# Patient Record
Sex: MALE | Race: WHITE | Employment: FULL TIME | URBAN - METROPOLITAN AREA
[De-identification: names, ages, dates, MRNs, and addresses within clinical notes are randomized per-mention and may not be internally consistent; named-entity substitution may affect disease eponyms.]

---

## 2023-06-29 ENCOUNTER — TRANSCRIBE ORDERS (OUTPATIENT)
Facility: HOSPITAL | Age: 73
End: 2023-06-29

## 2023-06-29 DIAGNOSIS — N50.89 TESTICULAR MASS: Primary | ICD-10-CM

## 2023-06-30 ENCOUNTER — HOSPITAL ENCOUNTER (OUTPATIENT)
Facility: HOSPITAL | Age: 73
Discharge: HOME | End: 2023-06-30
Attending: FAMILY MEDICINE
Payer: COMMERCIAL

## 2023-06-30 DIAGNOSIS — N50.89 TESTICULAR MASS: ICD-10-CM

## 2023-06-30 PROCEDURE — 76870 US EXAM SCROTUM: CPT

## 2024-02-07 ENCOUNTER — HOSPITAL ENCOUNTER (OUTPATIENT)
Facility: HOSPITAL | Age: 74
Discharge: HOME | End: 2024-02-10
Attending: FAMILY MEDICINE
Payer: COMMERCIAL

## 2024-02-07 DIAGNOSIS — K76.0 FATTY (CHANGE OF) LIVER, NOT ELSEWHERE CLASSIFIED: ICD-10-CM

## 2024-02-07 PROCEDURE — 76705 ECHO EXAM OF ABDOMEN: CPT

## 2024-02-08 ENCOUNTER — HOSPITAL ENCOUNTER (OUTPATIENT)
Facility: HOSPITAL | Age: 74
Discharge: HOME | End: 2024-02-08
Attending: SURGERY
Payer: COMMERCIAL

## 2024-02-08 ENCOUNTER — FOLLOWUP TELEPHONE ENCOUNTER (OUTPATIENT)
Facility: HOSPITAL | Age: 74
End: 2024-02-08

## 2024-02-08 VITALS
RESPIRATION RATE: 18 BRPM | HEART RATE: 67 BPM | WEIGHT: 240 LBS | DIASTOLIC BLOOD PRESSURE: 88 MMHG | HEIGHT: 68 IN | SYSTOLIC BLOOD PRESSURE: 141 MMHG | TEMPERATURE: 98.4 F | BODY MASS INDEX: 36.37 KG/M2

## 2024-02-08 DIAGNOSIS — L02.211 ABDOMINAL WALL ABSCESS: Primary | ICD-10-CM

## 2024-02-08 DIAGNOSIS — S31.103A OPEN WOUND OF RIGHT LOWER QUADRANT OF ABDOMINAL WALL WITHOUT PENETRATION INTO PERITONEAL CAVITY, INITIAL ENCOUNTER: ICD-10-CM

## 2024-02-08 PROCEDURE — 87070 CULTURE OTHR SPECIMN AEROBIC: CPT

## 2024-02-08 PROCEDURE — 99203 OFFICE O/P NEW LOW 30 MIN: CPT

## 2024-02-08 PROCEDURE — 87205 SMEAR GRAM STAIN: CPT

## 2024-02-08 PROCEDURE — 11042 DBRDMT SUBQ TIS 1ST 20SQCM/<: CPT

## 2024-02-08 RX ORDER — BETAMETHASONE DIPROPIONATE 0.5 MG/G
CREAM TOPICAL ONCE
OUTPATIENT
Start: 2024-02-08 | End: 2024-02-08

## 2024-02-08 RX ORDER — LIDOCAINE 40 MG/G
CREAM TOPICAL ONCE
OUTPATIENT
Start: 2024-02-08 | End: 2024-02-08

## 2024-02-08 RX ORDER — LIDOCAINE HYDROCHLORIDE 20 MG/ML
JELLY TOPICAL ONCE
Status: CANCELLED | OUTPATIENT
Start: 2024-02-08 | End: 2024-02-08

## 2024-02-08 RX ORDER — PANTOPRAZOLE SODIUM 40 MG/1
40 TABLET, DELAYED RELEASE ORAL DAILY
COMMUNITY

## 2024-02-08 RX ORDER — KETOCONAZOLE 20 MG/G
CREAM TOPICAL DAILY
COMMUNITY

## 2024-02-08 RX ORDER — ATORVASTATIN CALCIUM 40 MG/1
40 TABLET, FILM COATED ORAL DAILY
COMMUNITY

## 2024-02-08 RX ORDER — LORATADINE 10 MG/1
10 TABLET ORAL DAILY
COMMUNITY

## 2024-02-08 RX ORDER — LIDOCAINE 50 MG/G
OINTMENT TOPICAL ONCE
Status: CANCELLED | OUTPATIENT
Start: 2024-02-08 | End: 2024-02-08

## 2024-02-08 RX ORDER — LEVOFLOXACIN 500 MG/1
500 TABLET, FILM COATED ORAL DAILY
COMMUNITY
Start: 2024-01-26 | End: 2024-02-09

## 2024-02-08 RX ORDER — ALBUTEROL SULFATE 90 UG/1
2 AEROSOL, METERED RESPIRATORY (INHALATION) EVERY 4 HOURS PRN
COMMUNITY

## 2024-02-08 RX ORDER — INSULIN GLARGINE 300 U/ML
110 INJECTION, SOLUTION SUBCUTANEOUS DAILY
COMMUNITY

## 2024-02-08 RX ORDER — TRIAMCINOLONE ACETONIDE 1 MG/G
OINTMENT TOPICAL ONCE
OUTPATIENT
Start: 2024-02-08 | End: 2024-02-08

## 2024-02-08 RX ORDER — LIDOCAINE 50 MG/G
OINTMENT TOPICAL ONCE
OUTPATIENT
Start: 2024-02-08 | End: 2024-02-08

## 2024-02-08 RX ORDER — LIDOCAINE HYDROCHLORIDE 20 MG/ML
JELLY TOPICAL ONCE
OUTPATIENT
Start: 2024-02-08 | End: 2024-02-08

## 2024-02-08 RX ORDER — PROCHLORPERAZINE 25 MG/1
SUPPOSITORY RECTAL
COMMUNITY

## 2024-02-08 RX ORDER — SODIUM CHLOR/HYPOCHLOROUS ACID 0.033 %
SOLUTION, IRRIGATION IRRIGATION ONCE
Status: CANCELLED | OUTPATIENT
Start: 2024-02-08 | End: 2024-02-08

## 2024-02-08 RX ORDER — CANDESARTAN CILEXETIL AND HYDROCHLOROTHIAZIDE 16; 12.5 MG/1; MG/1
1 TABLET ORAL DAILY
COMMUNITY

## 2024-02-08 RX ORDER — ALPRAZOLAM 0.5 MG/1
0.5 TABLET ORAL 2 TIMES DAILY PRN
COMMUNITY

## 2024-02-08 RX ORDER — ACETAMINOPHEN 160 MG
TABLET,DISINTEGRATING ORAL DAILY
COMMUNITY

## 2024-02-08 RX ORDER — ASPIRIN AND DIPYRIDAMOLE 25; 200 MG/1; MG/1
1 CAPSULE, EXTENDED RELEASE ORAL 2 TIMES DAILY
COMMUNITY

## 2024-02-08 RX ORDER — LIDOCAINE 40 MG/G
CREAM TOPICAL ONCE
Status: CANCELLED | OUTPATIENT
Start: 2024-02-08 | End: 2024-02-08

## 2024-02-08 RX ORDER — EZETIMIBE 10 MG/1
10 TABLET ORAL DAILY
COMMUNITY

## 2024-02-08 RX ORDER — VITAMIN E 268 MG
400 CAPSULE ORAL DAILY
COMMUNITY

## 2024-02-08 RX ORDER — INSULIN LISPRO 200 [IU]/ML
INJECTION, SOLUTION SUBCUTANEOUS
COMMUNITY

## 2024-02-08 RX ORDER — CLOBETASOL PROPIONATE 0.5 MG/G
OINTMENT TOPICAL ONCE
OUTPATIENT
Start: 2024-02-08 | End: 2024-02-08

## 2024-02-08 RX ORDER — GINSENG 100 MG
CAPSULE ORAL ONCE
OUTPATIENT
Start: 2024-02-08 | End: 2024-02-08

## 2024-02-08 RX ORDER — GINSENG 100 MG
CAPSULE ORAL ONCE
Status: CANCELLED | OUTPATIENT
Start: 2024-02-08 | End: 2024-02-08

## 2024-02-08 RX ORDER — CLOBETASOL PROPIONATE 0.5 MG/G
OINTMENT TOPICAL ONCE
Status: CANCELLED | OUTPATIENT
Start: 2024-02-08 | End: 2024-02-08

## 2024-02-08 RX ORDER — AMOXICILLIN 500 MG
CAPSULE ORAL
COMMUNITY

## 2024-02-08 RX ORDER — BACITRACIN ZINC AND POLYMYXIN B SULFATE 500; 1000 [USP'U]/G; [USP'U]/G
OINTMENT TOPICAL ONCE
OUTPATIENT
Start: 2024-02-08 | End: 2024-02-08

## 2024-02-08 RX ORDER — LIDOCAINE HYDROCHLORIDE 40 MG/ML
SOLUTION TOPICAL ONCE
OUTPATIENT
Start: 2024-02-08 | End: 2024-02-08

## 2024-02-08 RX ORDER — LIDOCAINE HYDROCHLORIDE 40 MG/ML
SOLUTION TOPICAL ONCE
Status: CANCELLED | OUTPATIENT
Start: 2024-02-08 | End: 2024-02-08

## 2024-02-08 RX ORDER — GLIMEPIRIDE 2 MG/1
2 TABLET ORAL 2 TIMES DAILY
COMMUNITY

## 2024-02-08 RX ORDER — SODIUM CHLOR/HYPOCHLOROUS ACID 0.033 %
SOLUTION, IRRIGATION IRRIGATION ONCE
OUTPATIENT
Start: 2024-02-08 | End: 2024-02-08

## 2024-02-08 RX ORDER — ESCITALOPRAM OXALATE 10 MG/1
10 TABLET ORAL DAILY
COMMUNITY

## 2024-02-08 RX ORDER — BETAMETHASONE DIPROPIONATE 0.5 MG/G
CREAM TOPICAL ONCE
Status: CANCELLED | OUTPATIENT
Start: 2024-02-08 | End: 2024-02-08

## 2024-02-08 RX ORDER — CLINDAMYCIN HYDROCHLORIDE 300 MG/1
300 CAPSULE ORAL 3 TIMES DAILY
Qty: 21 CAPSULE | Refills: 0 | Status: SHIPPED | OUTPATIENT
Start: 2024-02-08 | End: 2024-02-15

## 2024-02-08 RX ORDER — TRIAMCINOLONE ACETONIDE 1 MG/G
OINTMENT TOPICAL ONCE
Status: CANCELLED | OUTPATIENT
Start: 2024-02-08 | End: 2024-02-08

## 2024-02-08 RX ORDER — BACITRACIN ZINC AND POLYMYXIN B SULFATE 500; 1000 [USP'U]/G; [USP'U]/G
OINTMENT TOPICAL ONCE
Status: CANCELLED | OUTPATIENT
Start: 2024-02-08 | End: 2024-02-08

## 2024-02-08 RX ORDER — ASPIRIN 81 MG/1
81 TABLET ORAL DAILY
COMMUNITY

## 2024-02-08 NOTE — FLOWSHEET NOTE
02/08/24 0928   Wound 02/08/24 Abdomen Right;Quadrant;Lower #1   Date First Assessed/Time First Assessed: 02/08/24 0850   Present on Original Admission: Yes  Wound Approximate Age at First Assessment (Weeks): 2 weeks  Primary Wound Type: Surgical Type  Location: Abdomen  Wound Location Orientation: Right;Quadrant;L...   Dressing Status New dressing applied   Dressing/Treatment Gauze dressing/dressing sponge;Tape/Soft cloth adhesive tape;Packing  (mupirocin ointment to packing strip)     Discharge Condition: Stable    Pain: 0    Ambulatory Status:Walking    Discharge Destination: Home    Transportation:Car    Accompanied by: Self and Family    Discharge instructions reviewed with Self and Family and copy or written instructions have been provided. All questions/concerns have been addressed at this time.

## 2024-02-08 NOTE — TELEPHONE ENCOUNTER
Culture results received from patients PCP.  Per Dr. Palacios, Levofloxacin has an intermediate coverage of bacteria that is growing.   Dr. Palacios prescribed Clindamycin.  This RN contacted patient to inform him of prescription, possible side effects, and suggestion of probiotics to improve gut side effects.  Patient voiced understanding.  No further questions or concerns at this time.

## 2024-02-08 NOTE — FLOWSHEET NOTE
02/08/24 0851   Anesthetic   Anesthetic 4% Lidocaine Liquid Topical   Wound 02/08/24 Abdomen Right;Quadrant;Lower #1   Date First Assessed/Time First Assessed: 02/08/24 0850   Present on Original Admission: Yes  Wound Approximate Age at First Assessment (Weeks): 2 weeks  Primary Wound Type: Surgical Type  Location: Abdomen  Wound Location Orientation: Right;Quadrant;L...   Wound Image    Dressing Status New drainage noted   Wound Cleansed Cleansed with saline   Wound Length (cm) 0.5 cm   Wound Width (cm) 0.9 cm   Wound Depth (cm) 0.6 cm   Wound Surface Area (cm^2) 0.45 cm^2   Wound Volume (cm^3) 0.27 cm^3   Undermining Starts ___ O'Clock 7   Undermining Ends___ O'Clock 1   Undermining Maxium Distance (cm) 0.8   Wound Assessment Slough;Pink/red;Erythema   Drainage Amount Moderate (25-50%)   Drainage Description Serosanguinous   Odor None   Ruth-wound Assessment Blanchable erythema   Margins Epibole (rolled edges)   Wound Thickness Description not for Pressure Injury Full thickness     BP (!) 141/88   Pulse 67   Temp 98.4 °F (36.9 °C) (Temporal)   Resp 18   Ht 1.727 m (5' 8\")   Wt 108.9 kg (240 lb)   BMI 36.49 kg/m²

## 2024-02-08 NOTE — PATIENT INSTRUCTIONS
Discharge Instructions for  [facility]  [address] [place] [address]  Telephone: [phone]     FAX [phone]    Wound Care Center Information: Should you experience any significant changes in your wound(s) or have questions about your wound care, please contact the [facility] at MONDAY - FRIDAY 8:00 am - 4:30.  If you need help with your wound outside these hours and cannot wait until we are again available, contact your PCP or go to the hospital emergency room.     NAME:  Nicholas Powell  YOB: 1950  DATE:  2/8/2024    : Halle     []    Wound Cleansing:   Do not scrub or use excessive force.  Cleanse wound prior to applying a clean dressing with:  [x] Normal Saline   [] Keep Wound Dry in Shower - may purchase a cast cover at local pharmacy     [] Cleanse wound with Mild Soap & Water    [x] May Shower: coordinate with dressing changes, remove dressing 1st, wash with mild soap and water, pat dry, and redress wound right after with a new dressing  [] Do not shower  [] cleanse with baby shampoo lather leave 2-3 then rinse with water    Topical Treatments:  Do not apply lotions, creams, or ointments to wound bed unless directed.   [] Apply moisturizing lotion such as to skin surrounding the wound prior to dressing change.  [] Other:     Dressings:                   Wound Location Abdomen     Apply Primary Dressing:      Pack wound loosely with:                                                   [x] Mupirocin lined plain packing strips     Cover and Secure with:  [x] Gauze [] ABD [] Optilock    [] Drawtex  [] Sushma [] Kerlix [] Mepilex Border  [] Ace Wrap [x] Roll Tape   [] Other:      Change dressing:   [x] Daily      [] Every Other Day   [] Three times per week  [] Once a week  [] Other:    Dietary:  [] Diet as tolerated [x] Diabetic Diet   [x] Increase Protein: examples (Meat, cheese, eggs, greek yogurt, fish,

## 2024-02-08 NOTE — PROGRESS NOTES
Wound Center  Progress Note / Procedure Note      Chief Complaint:  Nicholas Powell is a 73 y.o. {race/ethnicity:88224} male  with {Anatomy; lower extremities:58196} wound of *** {WEEKS/MONTHS:96934414} duration.    Referred by:  ***      Assessment/Plan     73 y.o. male with     -{Anatomy; lower extremities:23183} chronic ulcer.    -    -  ***    Following discussed with patient / spouse / CG/   ***  Needs :  Serial debridement- prn    Good local wound care  Dressing:  Frequency : three times a week / once daily / once weekly  Order/initiate Home Health***  Order supplies***  Continue Home Health***    -Edema management    -Nutrition optimization    -Good Diabetic control    -Good offloading    Patient/ *** understood and agrees with plan. Questions answered.    Serial visits and serial debridements also discussed.    Follow up with me in 1 week    Subjective:       HPI:     ***  Since last visit***    History/Chart/Medications reviewed    Wound caused by: {typewoundsb:65753}  Current wound care:See flowsheet  Offloading wound: {YES / NO:26744}  Elevating legs: ***  Compression compliance:***  Appetite: {condition:71119}  Wound associated pain: See flowsheet  Diabetic: ***  Smoker: ***  ROS: no N/V/D, no T/chills; no local rash, no chest pain or shortness of breath, no headache or dizzyness    Review of Systems:  Constitutional: Negative    HENT: Negative.   Eyes: Negative.    Respiratory: Negative.   Cardiovascular: Negative.   Gastrointestinal: Negative.   Genitourinary: Negative.   Musculoskeletal: Negative.   Skin: Negative.   Neurological: Negative.   Endo/Heme/Allergies: Negative.   Psychiatric/Behavioral: Negative.     Objective:     Physical Exam:   See flowsheet / nursing notes for vitals  Vitals:    02/08/24 0824   BP: (!) 141/88   Pulse: 67   Resp: 18   Temp: 98.4 °F (36.9 °C)     General: NAD. Hygiene good  Psych: cooperative. No anxiety or depression. Normal mood and affect.  Neuro: alert and oriented  No

## 2024-02-11 LAB
BACTERIA SPEC CULT: ABNORMAL
GRAM STN SPEC: ABNORMAL
SERVICE CMNT-IMP: ABNORMAL

## 2024-02-15 ENCOUNTER — HOSPITAL ENCOUNTER (OUTPATIENT)
Facility: HOSPITAL | Age: 74
Discharge: HOME | End: 2024-02-15
Attending: SURGERY
Payer: COMMERCIAL

## 2024-02-15 VITALS
SYSTOLIC BLOOD PRESSURE: 151 MMHG | RESPIRATION RATE: 18 BRPM | HEART RATE: 68 BPM | TEMPERATURE: 97.1 F | DIASTOLIC BLOOD PRESSURE: 76 MMHG

## 2024-02-15 DIAGNOSIS — L02.211 ABDOMINAL WALL ABSCESS: Primary | ICD-10-CM

## 2024-02-15 DIAGNOSIS — S31.103A OPEN WOUND OF RIGHT LOWER QUADRANT OF ABDOMINAL WALL WITHOUT PENETRATION INTO PERITONEAL CAVITY, INITIAL ENCOUNTER: ICD-10-CM

## 2024-02-15 PROCEDURE — 11042 DBRDMT SUBQ TIS 1ST 20SQCM/<: CPT

## 2024-02-15 RX ORDER — SODIUM CHLOR/HYPOCHLOROUS ACID 0.033 %
SOLUTION, IRRIGATION IRRIGATION ONCE
OUTPATIENT
Start: 2024-02-15 | End: 2024-02-15

## 2024-02-15 RX ORDER — BACITRACIN ZINC AND POLYMYXIN B SULFATE 500; 1000 [USP'U]/G; [USP'U]/G
OINTMENT TOPICAL ONCE
OUTPATIENT
Start: 2024-02-15 | End: 2024-02-15

## 2024-02-15 RX ORDER — LIDOCAINE HYDROCHLORIDE 20 MG/ML
JELLY TOPICAL ONCE
OUTPATIENT
Start: 2024-02-15 | End: 2024-02-15

## 2024-02-15 RX ORDER — CLOBETASOL PROPIONATE 0.5 MG/G
OINTMENT TOPICAL ONCE
OUTPATIENT
Start: 2024-02-15 | End: 2024-02-15

## 2024-02-15 RX ORDER — BETAMETHASONE DIPROPIONATE 0.5 MG/G
CREAM TOPICAL ONCE
OUTPATIENT
Start: 2024-02-15 | End: 2024-02-15

## 2024-02-15 RX ORDER — TRIAMCINOLONE ACETONIDE 1 MG/G
OINTMENT TOPICAL ONCE
OUTPATIENT
Start: 2024-02-15 | End: 2024-02-15

## 2024-02-15 RX ORDER — LIDOCAINE 40 MG/G
CREAM TOPICAL ONCE
OUTPATIENT
Start: 2024-02-15 | End: 2024-02-15

## 2024-02-15 RX ORDER — LIDOCAINE 50 MG/G
OINTMENT TOPICAL ONCE
OUTPATIENT
Start: 2024-02-15 | End: 2024-02-15

## 2024-02-15 RX ORDER — LIDOCAINE HYDROCHLORIDE 40 MG/ML
SOLUTION TOPICAL ONCE
OUTPATIENT
Start: 2024-02-15 | End: 2024-02-15

## 2024-02-15 RX ORDER — GINSENG 100 MG
CAPSULE ORAL ONCE
OUTPATIENT
Start: 2024-02-15 | End: 2024-02-15

## 2024-02-15 NOTE — FLOWSHEET NOTE
02/15/24 0941   Wound 02/08/24 Abdomen Right;Quadrant;Lower #1   Date First Assessed/Time First Assessed: 02/08/24 0850   Present on Original Admission: Yes  Wound Approximate Age at First Assessment (Weeks): 2 weeks  Primary Wound Type: Surgical Type  Location: Abdomen  Wound Location Orientation: Right;Quadrant;L...   Dressing Status New dressing applied   Dressing/Treatment Other (comment);Gauze dressing/dressing sponge;Tape/Soft cloth adhesive tape  (mupirocin)     Discharge Condition: Stable    Pain: 0    Ambulatory Status:Walking    Discharge Destination: Home    Transportation:Car    Accompanied by: Self and Family    Discharge instructions reviewed with Self and Family and copy or written instructions have been provided. All questions/concerns have been addressed at this time.

## 2024-02-15 NOTE — FLOWSHEET NOTE
02/15/24 0916   Anesthetic   Anesthetic 4% Lidocaine Liquid Topical   Wound 02/08/24 Abdomen Right;Quadrant;Lower #1   Date First Assessed/Time First Assessed: 02/08/24 0850   Present on Original Admission: Yes  Wound Approximate Age at First Assessment (Weeks): 2 weeks  Primary Wound Type: Surgical Type  Location: Abdomen  Wound Location Orientation: Right;Quadrant;L...   Wound Image    Wound Cleansed Cleansed with saline   Wound Length (cm) 0.3 cm   Wound Width (cm) 0.8 cm   Wound Depth (cm) 0.5 cm   Wound Surface Area (cm^2) 0.24 cm^2   Change in Wound Size % (l*w) 46.67   Wound Volume (cm^3) 0.12 cm^3   Wound Healing % 56   Undermining Starts ___ O'Clock 7   Undermining Ends___ O'Clock 1   Undermining Maxium Distance (cm) 0.6   Wound Assessment [place]/red;Slough   Drainage Amount Small (< 25%)   Drainage Description Serosanguinous   Odor None   Ruth-wound Assessment Blanchable erythema;Dry/flaky   Margins Epibole (rolled edges)   Wound Thickness Description not for Pressure Injury Full thickness   Pain Assessment   Pain Assessment None - Denies Pain     BP (!) 151/76   Pulse 68   Temp 97.1 °F (36.2 °C) (Temporal)   Resp 18

## 2024-02-15 NOTE — PATIENT INSTRUCTIONS
Discharge Instructions for  [facility]  [address] [place] [address]  Telephone: [phone]     FAX [phone]    Wound Care Center Information: Should you experience any significant changes in your wound(s) or have questions about your wound care, please contact the [facility] at MONDAY - FRIDAY 8:00 am - 4:30.  If you need help with your wound outside these hours and cannot wait until we are again available, contact your PCP or go to the hospital emergency room.     NAME:  Nicholas Powell  YOB: 1950  DATE:  2/15/2024    : aHlle     []    Wound Cleansing:   Do not scrub or use excessive force.  Cleanse wound prior to applying a clean dressing with:  [x] Normal Saline   [] Keep Wound Dry in Shower - may purchase a cast cover at local pharmacy     [] Cleanse wound with Mild Soap & Water    [x] May Shower: coordinate with dressing changes, remove dressing 1st, wash with mild soap and water, pat dry, and redress wound right after with a new dressing  [] Do not shower  [] cleanse with baby shampoo lather leave 2-3 then rinse with water    Topical Treatments:  Do not apply lotions, creams, or ointments to wound bed unless directed.   [] Apply moisturizing lotion such as to skin surrounding the wound prior to dressing change.  [] Other:     Dressings:                   Wound Location Abdomen     Apply Primary Dressing:                                                      [x] Mupirocin ointment    Cover and Secure with:  [x] Gauze [] ABD [] Optilock    [] Drawtex  [] Sushma [] Kerlix [] Mepilex Border  [] Ace Wrap [x] Roll Tape   [] Other:      Change dressing:   [x] Daily      [] Every Other Day   [] Three times per week  [] Once a week  [] Other:    Dietary:  [] Diet as tolerated [x] Diabetic Diet   [x] Increase Protein: examples (Meat, cheese, eggs, greek yogurt, fish, nuts)   [] Adan Therapeutic Nutrition Powder  []

## 2024-02-15 NOTE — PROGRESS NOTES
Wound Center  Progress Note / Procedure Note      Chief Complaint:  Nicholas Powell is a 73 y.o.  male  with abdo wound of >1 weeks duration.      Assessment/Plan     73 y.o. male with Dm2    -RLQ abdo chronic ulcer.  S/p I &D abscess  Infection improved, smaller, filling in  Full thickness  Slough/granular  Necessitates debridement  for wound healing and to prevent/heal infection  Ulcer needs debridement- see note below    Dm2  A1c 6.9        Following discussed with patient / spouse   Needs :  Serial debridement- prn    Good local wound care  Dressing: mupirocin   Frequency : three times a week      -Edema management    -Nutrition optimization    -Good Diabetic control    -Good offloading    Patient/  understood and agrees with plan. Questions answered.    Serial visits and serial debridements also discussed.    Follow up with me in 1 week    Subjective:   Since last visit  No new issues  Tolerated clinda- last dose today, no SE  Taking probiotic    HPI:     Abscess developed abdomen. Dr Wong, PCP, did I&D, given levaquin and culture done and referred here  Wife has been packing wound      History/Chart/Medications reviewed    Wound caused by:  infectious  Current wound care:See flowsheet  Offloading wound: Yes  Appetite: good  Wound associated pain: See flowsheet  Diabetic: yes  Smoker: no  ROS: no N/V/D, no T/chills; no local rash, no chest pain or shortness of breath, no headache or dizzyness      Objective:     Physical Exam:   See flowsheet / nursing notes for vitals  Vitals:    02/15/24 0916   BP: (!) 151/76   Pulse: 68   Resp: 18   Temp: 97.1 °F (36.2 °C)     General: NAD. Hygiene good  Psych: cooperative. No anxiety or depression. Normal mood and affect.  Neuro: alert and oriented to person/place/situation. Otherwise nonfocal.  Derm: Normal  turgor for age, dry skin  HEENT: Normocephalic, atraumatic. EOMI. Conjunctiva clear. No scleral icterus.  Neck: Normal range of motion.   Chest:

## 2024-02-15 NOTE — FLOWSHEET NOTE
02/15/24 0916   Anesthetic   Anesthetic 4% Lidocaine Liquid Topical   Wound 02/08/24 Abdomen Right;Quadrant;Lower #1   Date First Assessed/Time First Assessed: 02/08/24 0850   Present on Original Admission: Yes  Wound Approximate Age at First Assessment (Weeks): 2 weeks  Primary Wound Type: Surgical Type  Location: Abdomen  Wound Location Orientation: Right;Quadrant;L...   Wound Cleansed Cleansed with saline   Wound Length (cm) 0.3 cm   Wound Width (cm) 0.8 cm   Wound Depth (cm) 0.5 cm   Wound Surface Area (cm^2) 0.24 cm^2   Change in Wound Size % (l*w) 46.67   Wound Volume (cm^3) 0.12 cm^3   Wound Healing % 56   Undermining Starts ___ O'Clock 7   Undermining Ends___ O'Clock 1   Undermining Maxium Distance (cm) 0.6   Wound Assessment [place]/red;Slough   Drainage Amount Small (< 25%)   Drainage Description Serosanguinous   Odor None   Ruth-wound Assessment Blanchable erythema;Dry/flaky   Margins Epibole (rolled edges)   Wound Thickness Description not for Pressure Injury Full thickness   Pain Assessment   Pain Assessment None - Denies Pain     BP (!) 151/76   Pulse 68   Temp 97.1 °F (36.2 °C) (Temporal)   Resp 18

## 2024-02-22 ENCOUNTER — HOSPITAL ENCOUNTER (OUTPATIENT)
Facility: HOSPITAL | Age: 74
Discharge: HOME | End: 2024-02-22
Attending: SURGERY
Payer: COMMERCIAL

## 2024-02-22 VITALS
RESPIRATION RATE: 18 BRPM | SYSTOLIC BLOOD PRESSURE: 133 MMHG | HEART RATE: 73 BPM | DIASTOLIC BLOOD PRESSURE: 84 MMHG | TEMPERATURE: 98.8 F

## 2024-02-22 DIAGNOSIS — L02.211 ABDOMINAL WALL ABSCESS: Primary | ICD-10-CM

## 2024-02-22 DIAGNOSIS — S31.103A OPEN WOUND OF RIGHT LOWER QUADRANT OF ABDOMINAL WALL WITHOUT PENETRATION INTO PERITONEAL CAVITY, INITIAL ENCOUNTER: ICD-10-CM

## 2024-02-22 PROCEDURE — 11042 DBRDMT SUBQ TIS 1ST 20SQCM/<: CPT

## 2024-02-22 RX ORDER — CLOBETASOL PROPIONATE 0.5 MG/G
OINTMENT TOPICAL ONCE
OUTPATIENT
Start: 2024-02-22 | End: 2024-02-22

## 2024-02-22 RX ORDER — LIDOCAINE 40 MG/G
CREAM TOPICAL ONCE
OUTPATIENT
Start: 2024-02-22 | End: 2024-02-22

## 2024-02-22 RX ORDER — BETAMETHASONE DIPROPIONATE 0.5 MG/G
CREAM TOPICAL ONCE
OUTPATIENT
Start: 2024-02-22 | End: 2024-02-22

## 2024-02-22 RX ORDER — GINSENG 100 MG
CAPSULE ORAL ONCE
OUTPATIENT
Start: 2024-02-22 | End: 2024-02-22

## 2024-02-22 RX ORDER — LIDOCAINE HYDROCHLORIDE 40 MG/ML
SOLUTION TOPICAL ONCE
OUTPATIENT
Start: 2024-02-22 | End: 2024-02-22

## 2024-02-22 RX ORDER — BACITRACIN ZINC AND POLYMYXIN B SULFATE 500; 1000 [USP'U]/G; [USP'U]/G
OINTMENT TOPICAL ONCE
OUTPATIENT
Start: 2024-02-22 | End: 2024-02-22

## 2024-02-22 RX ORDER — LIDOCAINE HYDROCHLORIDE 20 MG/ML
JELLY TOPICAL ONCE
OUTPATIENT
Start: 2024-02-22 | End: 2024-02-22

## 2024-02-22 RX ORDER — TRIAMCINOLONE ACETONIDE 1 MG/G
OINTMENT TOPICAL ONCE
OUTPATIENT
Start: 2024-02-22 | End: 2024-02-22

## 2024-02-22 RX ORDER — SODIUM CHLOR/HYPOCHLOROUS ACID 0.033 %
SOLUTION, IRRIGATION IRRIGATION ONCE
OUTPATIENT
Start: 2024-02-22 | End: 2024-02-22

## 2024-02-22 RX ORDER — LIDOCAINE 50 MG/G
OINTMENT TOPICAL ONCE
OUTPATIENT
Start: 2024-02-22 | End: 2024-02-22

## 2024-02-22 NOTE — PROGRESS NOTES
Wound Center  Progress Note / Procedure Note      Chief Complaint:  Nicholas Powell is a 73 y.o.  male  with abdo wound of >1 weeks duration.      Assessment/Plan     73 y.o. male with Dm2    -RLQ abdo chronic ulcer.  S/p I &D abscess  smaller, filling in  Full thickness  Slough/granular  Necessitates debridement  for wound healing and to prevent/heal infection  Ulcer needs debridement- see note below    Dm2  A1c 6.9        Following discussed with patient / spouse   Needs :  Serial debridement- prn    Good local wound care  Dressing: D/C mupirocin. START therahoney  Frequency : three times a week    Patient/  understood and agrees with plan. Questions answered.        Follow up with me in 1 week    Subjective:   Since last visit  No new issues      HPI:     Abscess developed abdomen. Dr Wong, PCP, did I&D, given levaquin and culture done and referred here  Wife has been packing wound      History/Chart/Medications reviewed    Wound caused by:  infectious  Current wound care:See flowsheet  Offloading wound: Yes  Appetite: good  Wound associated pain: See flowsheet  Diabetic: yes  Smoker: no  ROS: no N/V/D, no T/chills; no local rash, no chest pain or shortness of breath, no headache or dizzyness      Objective:     Physical Exam:   See flowsheet / nursing notes for vitals  Vitals:    02/22/24 1309   BP: 133/84   Pulse: 73   Resp: 18   Temp: 98.8 °F (37.1 °C)     General: NAD. Hygiene good  Psych: cooperative. No anxiety or depression. Normal mood and affect.  Neuro: alert and oriented to person/place/situation. Otherwise nonfocal.  Derm: Normal  turgor for age, dry skin  HEENT: Normocephalic, atraumatic. EOMI. Conjunctiva clear. No scleral icterus.  Neck: Normal range of motion.   Chest: Respirations nonlabored  Lower extremities: color normal; temperature normal.  Ulcer Description:   See Flowsheet           Data Review:   Cx staph aureus, mod growth             Procedure:   Ulcer assessment: Due to

## 2024-02-22 NOTE — FLOWSHEET NOTE
02/22/24 1335   Wound 02/08/24 Abdomen Right;Quadrant;Lower #1   Date First Assessed/Time First Assessed: 02/08/24 0850   Present on Original Admission: Yes  Wound Approximate Age at First Assessment (Weeks): 2 weeks  Primary Wound Type: Surgical Type  Location: Abdomen  Wound Location Orientation: Right;Quadrant;L...   Dressing Status New dressing applied   Dressing/Treatment Honey gel/honey paste;Gauze dressing/dressing sponge;Tape/Soft cloth adhesive tape     Discharge Condition: Stable    Pain: 0    Ambulatory Status:Walking    Discharge Destination: Home    Transportation:Car    Accompanied by: Self and Family    Discharge instructions reviewed with Self and Family and copy or written instructions have been provided. All questions/concerns have been addressed at this time.

## 2024-02-22 NOTE — PATIENT INSTRUCTIONS
Discharge Instructions for  [facility]  [address] [place] [address]  Telephone: [phone]     FAX [phone]    Wound Care Center Information: Should you experience any significant changes in your wound(s) or have questions about your wound care, please contact the [facility] at MONDAY - FRIDAY 8:00 am - 4:30.  If you need help with your wound outside these hours and cannot wait until we are again available, contact your PCP or go to the hospital emergency room.     NAME:  Nicholas Powell  YOB: 1950  DATE:  2/22/2024    : Halle     Wound Cleansing:   Do not scrub or use excessive force.  Cleanse wound prior to applying a clean dressing with:  [x] Normal Saline   [] Keep Wound Dry in Shower - may purchase a cast cover at local pharmacy     [] Cleanse wound with Mild Soap & Water    [x] May Shower: coordinate with dressing changes, remove dressing 1st, wash with mild soap and water, pat dry, and redress wound right after with a new dressing  [] Do not shower  [] cleanse with baby shampoo lather leave 2-3 then rinse with water    Topical Treatments:  Do not apply lotions, creams, or ointments to wound bed unless directed.   [] Apply moisturizing lotion such as to skin surrounding the wound prior to dressing change.  [] Other:     Dressings:                   Wound Location Abdomen     Apply Primary Dressing:                                                      [x] Medi-honey gel    Cover and Secure with:  [x] Gauze [] ABD [] Optilock    [] Drawtex  [] Sushma [] Kerlix [] Mepilex Border  [] Ace Wrap [x] Roll Tape   [] Other:      Change dressing:   [] Daily      [] Every Other Day   [x] Three times per week  [] Once a week  [] Other:    Dietary:  [] Diet as tolerated [x] Diabetic Diet   [x] Increase Protein: examples (Meat, cheese, eggs, greek yogurt, fish, nuts)   [] Adan Therapeutic Nutrition Powder  [] Dial a

## 2024-02-22 NOTE — FLOWSHEET NOTE
02/22/24 1309   Anesthetic   Anesthetic 4% Lidocaine Liquid Topical   Wound 02/08/24 Abdomen Right;Quadrant;Lower #1   Date First Assessed/Time First Assessed: 02/08/24 0850   Present on Original Admission: Yes  Wound Approximate Age at First Assessment (Weeks): 2 weeks  Primary Wound Type: Surgical Type  Location: Abdomen  Wound Location Orientation: Right;Quadrant;L...   Wound Image    Wound Cleansed Cleansed with saline   Wound Length (cm) 0.1 cm   Wound Width (cm) 0.2 cm   Wound Depth (cm) 0.1 cm   Wound Surface Area (cm^2) 0.02 cm^2   Change in Wound Size % (l*w) 95.56   Wound Volume (cm^3) 0.002 cm^3   Wound Healing % 99   Wound Assessment [place]/red;Slough   Drainage Amount Scant (moist but unmeasurable)   Drainage Description Serosanguinous   Odor None   Ruth-wound Assessment Intact   Margins Epibole (rolled edges)   Pain Assessment   Pain Assessment None - Denies Pain     /84   Pulse 73   Temp 98.8 °F (37.1 °C) (Temporal)   Resp 18

## 2024-02-29 ENCOUNTER — HOSPITAL ENCOUNTER (OUTPATIENT)
Facility: HOSPITAL | Age: 74
Discharge: HOME | End: 2024-02-29
Attending: SURGERY
Payer: COMMERCIAL

## 2024-02-29 VITALS
DIASTOLIC BLOOD PRESSURE: 84 MMHG | RESPIRATION RATE: 19 BRPM | TEMPERATURE: 98.1 F | HEART RATE: 73 BPM | SYSTOLIC BLOOD PRESSURE: 132 MMHG

## 2024-02-29 DIAGNOSIS — S31.103D OPEN WOUND OF RIGHT LOWER QUADRANT OF ABDOMINAL WALL WITHOUT PENETRATION INTO PERITONEAL CAVITY, SUBSEQUENT ENCOUNTER: ICD-10-CM

## 2024-02-29 DIAGNOSIS — L02.211 ABDOMINAL WALL ABSCESS: Primary | ICD-10-CM

## 2024-02-29 DIAGNOSIS — S31.103A OPEN WOUND OF RIGHT LOWER QUADRANT OF ABDOMINAL WALL WITHOUT PENETRATION INTO PERITONEAL CAVITY, INITIAL ENCOUNTER: ICD-10-CM

## 2024-02-29 PROCEDURE — 99211 OFF/OP EST MAY X REQ PHY/QHP: CPT

## 2024-02-29 RX ORDER — LIDOCAINE HYDROCHLORIDE 40 MG/ML
SOLUTION TOPICAL ONCE
Status: CANCELLED | OUTPATIENT
Start: 2024-02-29 | End: 2024-02-29

## 2024-02-29 RX ORDER — TRIAMCINOLONE ACETONIDE 1 MG/G
OINTMENT TOPICAL ONCE
Status: CANCELLED | OUTPATIENT
Start: 2024-02-29 | End: 2024-02-29

## 2024-02-29 RX ORDER — LIDOCAINE 40 MG/G
CREAM TOPICAL ONCE
Status: CANCELLED | OUTPATIENT
Start: 2024-02-29 | End: 2024-02-29

## 2024-02-29 RX ORDER — LIDOCAINE 50 MG/G
OINTMENT TOPICAL ONCE
Status: CANCELLED | OUTPATIENT
Start: 2024-02-29 | End: 2024-02-29

## 2024-02-29 RX ORDER — LIDOCAINE HYDROCHLORIDE 20 MG/ML
JELLY TOPICAL ONCE
Status: CANCELLED | OUTPATIENT
Start: 2024-02-29 | End: 2024-02-29

## 2024-02-29 RX ORDER — BETAMETHASONE DIPROPIONATE 0.5 MG/G
CREAM TOPICAL ONCE
Status: CANCELLED | OUTPATIENT
Start: 2024-02-29 | End: 2024-02-29

## 2024-02-29 RX ORDER — CLOBETASOL PROPIONATE 0.5 MG/G
OINTMENT TOPICAL ONCE
Status: CANCELLED | OUTPATIENT
Start: 2024-02-29 | End: 2024-02-29

## 2024-02-29 RX ORDER — SODIUM CHLOR/HYPOCHLOROUS ACID 0.033 %
SOLUTION, IRRIGATION IRRIGATION ONCE
Status: CANCELLED | OUTPATIENT
Start: 2024-02-29 | End: 2024-02-29

## 2024-02-29 RX ORDER — GINSENG 100 MG
CAPSULE ORAL ONCE
Status: CANCELLED | OUTPATIENT
Start: 2024-02-29 | End: 2024-02-29

## 2024-02-29 RX ORDER — BACITRACIN ZINC AND POLYMYXIN B SULFATE 500; 1000 [USP'U]/G; [USP'U]/G
OINTMENT TOPICAL ONCE
Status: CANCELLED | OUTPATIENT
Start: 2024-02-29 | End: 2024-02-29

## 2024-02-29 NOTE — PATIENT INSTRUCTIONS
Discharge Instructions for  [facility]  [address] [place] [address]  Telephone: [phone]     FAX [phone]    Wound Care Center Information: Should you experience any significant changes in your wound(s) or have questions about your wound care, please contact the [facility] at MONDAY - FRIDAY 8:00 am - 4:30.  If you need help with your wound outside these hours and cannot wait until we are again available, contact your PCP or go to the hospital emergency room.     NAME:  Nicholas Powell  YOB: 1950  DATE:  2/29/2024    : Halle      [x] May Shower  [x] Avoid soaking healed site in water for another 2 to 3 weeks  [x] Apply moisturizing lotion such as A&D ointment or Aquaphor to healed skin twice daily.    [facility] THANK YOU    Your treatment has been completed at [facility] outpatient wound clinic on 2/29/2024, per Dr. Janna Palacios.    We know patients have several options when choosing a health care provider. We would like to express our sincere appreciation for having had the chance to be yours. More importantly, we enjoy having you as part of our family.     We also hope your experience with us has surpassed your expectations and that you have been pleased with our service. We appreciate the confidence you've shown in selecting us as your health care provider and we will continue or commitment to provide the highest quality of service.      Again, thank you for choosing us for your health care needs. If you have any further questions or concerns, please call [phone]. It has been our pleasure serving you and we hope to continue our relationship in the future, if the need occurs.     Sincerely,  [facility] Team       Electronically signed on 2/29/2024 at 8:11 AM      Physician Signature:_______________________  Dr. Janna Palacios

## 2024-02-29 NOTE — FLOWSHEET NOTE
02/29/24 1304   Wound 02/08/24 Abdomen Right;Quadrant;Lower #1   Date First Assessed/Time First Assessed: 02/08/24 0850   Present on Original Admission: Yes  Wound Approximate Age at First Assessment (Weeks): 2 weeks  Primary Wound Type: Surgical Type  Location: Abdomen  Wound Location Orientation: Right;Quadrant;L...   Wound Image    Dressing Status Clean;Dry;Intact   Wound Cleansed Cleansed with saline   Wound Length (cm) 0.1 cm   Wound Width (cm) 0.1 cm   Wound Depth (cm) 0.1 cm   Wound Surface Area (cm^2) 0.01 cm^2   Change in Wound Size % (l*w) 97.78   Wound Volume (cm^3) 0.001 cm^3   Wound Healing % 100   Wound Assessment Epithelialization   Drainage Amount None (dry)   Odor None   Ruth-wound Assessment Blanchable erythema   Margins Attached edges     /84   Pulse 73   Temp 98.1 °F (36.7 °C) (Temporal)   Resp 19

## 2024-02-29 NOTE — PROGRESS NOTES
Wound Center  Progress Note       Chief Complaint:  Nicholas Powell is a 73 y.o.  male  with abdo wound of >few weeks duration.      Assessment/Plan     73 y.o. male with Dm2    -RLQ abdo chronic ulcer.  S/p I &D abscess  healed  Prevention discussed  Vit A&D ointment or vaseline ointment few times daily for few weeks  No baths/soaks for few weeks        Follow up prn    Subjective:   Since last visit  No new issues      HPI:     Abscess developed abdomen. Dr Wong, PCP, did I&D, given levaquin and culture done and referred here  Wife has been packing wound      History/Chart/Medications reviewed    Wound caused by:  infectious  Current wound care:See flowsheet  Offloading wound: Yes  Appetite: good  Wound associated pain: See flowsheet  Diabetic: yes  Smoker: no  ROS: no N/V/D, no T/chills; no local rash, no chest pain or shortness of breath, no headache or dizzyness      Objective:     Physical Exam:   See flowsheet / nursing notes for vitals  Vitals:    02/29/24 1301   BP: 132/84   Pulse: 73   Resp: 19   Temp: 98.1 °F (36.7 °C)     General: NAD. Hygiene good  Psych: cooperative. No anxiety or depression. Normal mood and affect.  Neuro: alert and oriented to person/place/situation. Otherwise nonfocal.  Derm: Normal  turgor for age, dry skin  HEENT: Normocephalic, atraumatic. EOMI. Conjunctiva clear. No scleral icterus.  Neck: Normal range of motion.   Chest: Respirations nonlabored  Lower extremities: color normal; temperature normal.  Ulcer Description:   See Flowsheet           Data Review:   Cx staph aureus, mod growth             Procedure:      N/a    -------    Past Medical History:   Diagnosis Date    Anxiety     Calculus of kidney and ureter     Diabetes mellitus (HCC)     DM (diabetes mellitus), type 2 (HCC)     Dysthymia     ED (erectile dysfunction) of organic origin     Gastro-esophageal reflux     Hyperlipidemia     Hypertension     Non-alcoholic fatty liver disease     Osteoarthritis

## 2024-02-29 NOTE — WOUND CARE
Discharge Condition: Stable    Pain: 0    Ambulatory Status: None    Discharge Destination: home    Transportation:car    Accompanied by: Family     Discharge instructions reviewed with SELF and copy or written instructions have been provided. All questions/concerns have been addressed at this time.

## 2024-10-26 ENCOUNTER — HOSPITAL ENCOUNTER (EMERGENCY)
Facility: HOSPITAL | Age: 74
Discharge: HOME | End: 2024-10-26
Attending: STUDENT IN AN ORGANIZED HEALTH CARE EDUCATION/TRAINING PROGRAM
Payer: COMMERCIAL

## 2024-10-26 VITALS
SYSTOLIC BLOOD PRESSURE: 137 MMHG | OXYGEN SATURATION: 93 % | HEIGHT: 69 IN | HEART RATE: 94 BPM | BODY MASS INDEX: 35.55 KG/M2 | TEMPERATURE: 98.3 F | RESPIRATION RATE: 16 BRPM | WEIGHT: 240 LBS | DIASTOLIC BLOOD PRESSURE: 76 MMHG

## 2024-10-26 DIAGNOSIS — N30.00 ACUTE CYSTITIS WITHOUT HEMATURIA: Primary | ICD-10-CM

## 2024-10-26 LAB
APPEARANCE UR: ABNORMAL
BACTERIA URNS QL MICRO: ABNORMAL /HPF
BILIRUB UR QL: NEGATIVE
COLOR UR: ABNORMAL
EPITH CASTS URNS QL MICRO: ABNORMAL /LPF
GLUCOSE UR STRIP.AUTO-MCNC: NEGATIVE MG/DL
HGB UR QL STRIP: ABNORMAL
KETONES UR QL STRIP.AUTO: 15 MG/DL
LEUKOCYTE ESTERASE UR QL STRIP.AUTO: ABNORMAL
NITRITE UR QL STRIP.AUTO: POSITIVE
PH UR STRIP: 5.5 (ref 5–8)
PROT UR STRIP-MCNC: NEGATIVE MG/DL
RBC #/AREA URNS HPF: ABNORMAL /HPF (ref 0–5)
SP GR UR REFRACTOMETRY: 1.03 (ref 1–1.03)
UROBILINOGEN UR QL STRIP.AUTO: 0.2 EU/DL (ref 0.2–1)
WBC URNS QL MICRO: >100 /HPF (ref 0–4)

## 2024-10-26 PROCEDURE — 99283 EMERGENCY DEPT VISIT LOW MDM: CPT

## 2024-10-26 PROCEDURE — 81001 URINALYSIS AUTO W/SCOPE: CPT

## 2024-10-26 PROCEDURE — 6370000000 HC RX 637 (ALT 250 FOR IP): Performed by: STUDENT IN AN ORGANIZED HEALTH CARE EDUCATION/TRAINING PROGRAM

## 2024-10-26 RX ORDER — CEFDINIR 300 MG/1
300 CAPSULE ORAL
Status: COMPLETED | OUTPATIENT
Start: 2024-10-26 | End: 2024-10-26

## 2024-10-26 RX ORDER — CEFDINIR 300 MG/1
300 CAPSULE ORAL 2 TIMES DAILY
Qty: 20 CAPSULE | Refills: 0 | Status: SHIPPED | OUTPATIENT
Start: 2024-10-26 | End: 2024-11-05

## 2024-10-26 RX ADMIN — CEFDINIR 300 MG: 300 CAPSULE ORAL at 11:00

## 2024-10-26 ASSESSMENT — PAIN DESCRIPTION - DESCRIPTORS: DESCRIPTORS: ACHING

## 2024-10-26 ASSESSMENT — PAIN SCALES - GENERAL: PAINLEVEL_OUTOF10: 3

## 2024-10-26 ASSESSMENT — PAIN DESCRIPTION - LOCATION: LOCATION: GENERALIZED

## 2024-10-26 ASSESSMENT — PAIN - FUNCTIONAL ASSESSMENT: PAIN_FUNCTIONAL_ASSESSMENT: 0-10

## 2024-10-26 NOTE — ED PROVIDER NOTES
[facility] [place]  EMERGENCY DEPARTMENT ENCOUNTER      Pt Name: Nicholas Powell  MRN: [id number]  Birthdate 1950  Date of evaluation: 10/26/2024  Provider: Hardeep Cano MD    CHIEF COMPLAINT       Chief Complaint   Patient presents with    Dysuria       HISTORY OF PRESENT ILLNESS    HPI    74-year-old male history of diabetes hypertension chronic liver disease presenting for dysuria.  Patient reports 2 days of burning with urination and mild urinary frequency.  Woke up this morning also with bodyaches and myalgias.  Denies fevers chills nausea vomiting.  Denies back pain.  Denies blood in urine.  Denies any change to bowel movements.   No prior UTIs, but did have prostatitis many years ago.    Nursing notes reviewed.    REVIEW OF SYSTEMS     Review of Systems  Unless otherwise stated, a complete review of systems was asked of the patient. Pertinent positives are noted in the HPI section.    PAST MEDICAL HISTORY     Past Medical History:   Diagnosis Date    Anxiety     Calculus of kidney and ureter     Diabetes mellitus (HCC)     DM (diabetes mellitus), type 2 (HCC)     Dysthymia     ED (erectile dysfunction) of organic origin     Gastro-esophageal reflux     Hyperlipidemia     Hypertension     Non-alcoholic fatty liver disease     Osteoarthritis     right knee    Osteoarthritis     left knee    Right bundle branch block     Sleep apnea, obstructive     Syncope     Systolic murmur     TIA (transient ischemic attack)     Trigger finger of right hand     Vitamin D deficiency        SURGICAL HISTORY       Past Surgical History:   Procedure Laterality Date    HERNIA REPAIR      KNEE ARTHROSCOPY Right     KNEE ARTHROSCOPY Left     SCROTAL SURGERY      drainage of scrotal abcess    SINUS SURGERY         CURRENT MEDICATIONS       Discharge Medication List as of 10/26/2024 10:40 AM        CONTINUE these medications which have NOT CHANGED    Details   aspirin 81 MG EC tablet Take 1 tablet by mouth  Historical Med      pantoprazole (PROTONIX) 40 MG tablet Take 1 tablet by mouth dailyHistorical Med      Insulin Glargine, 2 Unit Dial, (TOUJEO MAX SOLOSTAR) 300 UNIT/ML SOPN Inject 110 Units into the skin dailyHistorical Med      Cholecalciferol (VITAMIN D3) 50 MCG (2000 UT) CAPS Take by mouth dailyHistorical Med      vitamin E 180 MG (400 UNIT) CAPS capsule Take 1 capsule by mouth dailyHistorical Med             ALLERGIES     Erythromycin, Gentamicin, Pcn [penicillins], Sulfa antibiotics, Vancomycin, and Doxycycline    FAMILY HISTORY     No family history on file.     SOCIAL HISTORY       Social History     Socioeconomic History    Marital status:    Tobacco Use    Smoking status: Never     Passive exposure: Never    Smokeless tobacco: Never   Vaping Use    Vaping status: Never Used   Substance and Sexual Activity    Alcohol use: Yes     Alcohol/week: 1.0 standard drink of alcohol     Types: 1 Glasses of wine per week     Comment: weekly    Drug use: Never       PHYSICAL EXAM       ED Triage Vitals [10/26/24 0939]   BP Systolic BP Percentile Diastolic BP Percentile Temp Temp Source Pulse Respirations SpO2   137/76 -- -- 98.3 °F (36.8 °C) Oral 94 16 93 %      Height Weight - Scale         1.753 m (5' 9\") 108.9 kg (240 lb)           Physical Exam  Constitutional:       Appearance: Normal appearance.   HENT:      Head: Normocephalic and atraumatic.   Eyes:      Extraocular Movements: Extraocular movements intact.      Pupils: Pupils are equal, round, and reactive to light.   Cardiovascular:      Rate and Rhythm: Normal rate and regular rhythm.   Pulmonary:      Effort: Pulmonary effort is normal.      Breath sounds: Normal breath sounds.   Abdominal:      General: Abdomen is flat. There is no distension.   Musculoskeletal:         General: No deformity or signs of injury.      Cervical back: Normal range of motion and neck supple.   Skin:     Coloration: Skin is not jaundiced.   Neurological:      General: No

## 2024-10-26 NOTE — DISCHARGE INSTRUCTIONS
Take the prescribed antibiotic for urinary infection.  Return to the ER if you develop fevers, worsening body aches or other pain, back pain, nausea or any other changing symptoms.  Otherwise follow-up with your primary doctor within 1 week for reevaluation and further care.

## 2024-10-26 NOTE — ED TRIAGE NOTES
Patient arrived ambulatory via POV with cc dysuria, nausea, and generalized body aches that started last night. Denies hematuria. Reports kidney stones in the past. Denies flank/abdominal pain. Last took tylenol at 0800